# Patient Record
Sex: FEMALE | Race: WHITE
[De-identification: names, ages, dates, MRNs, and addresses within clinical notes are randomized per-mention and may not be internally consistent; named-entity substitution may affect disease eponyms.]

---

## 2019-11-06 ENCOUNTER — HOSPITAL ENCOUNTER (OUTPATIENT)
Dept: HOSPITAL 74 - FASU | Age: 79
Discharge: HOME | End: 2019-11-06
Attending: ORTHOPAEDIC SURGERY
Payer: COMMERCIAL

## 2019-11-06 VITALS — HEART RATE: 70 BPM | DIASTOLIC BLOOD PRESSURE: 66 MMHG | SYSTOLIC BLOOD PRESSURE: 124 MMHG

## 2019-11-06 VITALS — TEMPERATURE: 97.9 F

## 2019-11-06 VITALS — BODY MASS INDEX: 19.5 KG/M2

## 2019-11-06 DIAGNOSIS — M18.11: Primary | ICD-10-CM

## 2019-11-06 PROCEDURE — 0RQS0ZZ REPAIR RIGHT CARPOMETACARPAL JOINT, OPEN APPROACH: ICD-10-PCS | Performed by: ORTHOPAEDIC SURGERY

## 2019-11-06 PROCEDURE — 0LX70ZZ TRANSFER RIGHT HAND TENDON, OPEN APPROACH: ICD-10-PCS | Performed by: ORTHOPAEDIC SURGERY

## 2019-11-11 NOTE — OP
DATE OF OPERATION:  11/06/2019

 

PREOPERATIVE DIAGNOSIS:  Right basal joint arthritis.

 

POSTOPERATIVE DIAGNOSIS:  Right basal joint arthritis.

 

OPERATIVE PROCEDURE:  

1.  Right basal joint arthroplasty.

2.  Right thumb carpometacarpal tendon transfer.

 

SURGEON:  Kee Proctor MD

 

ASSISTANT:  MARLEN Mathew

 

ANESTHESIA:  Regional.

 

COMPLICATIONS:  None.

 

ESTIMATED BLOOD LOSS:  Minimal.

 

INDICATIONS FOR PROCEDURE:  The patient is a 79-year-old female with the above

finding, indicated for operative treatment.  The risks, benefits and alternatives

were discussed with the patient at length.  Proper informed consent was obtained.  

 

PROCEDURE:  After proper identification of patient and correct operative site,

patient was brought to the operating room, placed supine on the table, prominences

well padded.  Sedation and regional anesthesia were given.  Intravenous antibiotics

given.  Timeout procedure was performed.  Right upper extremity was prepped and

draped in usual sterile fashion.  Well-padded tourniquet was placed over the sterile

prep.  Esmarch bandage was used to exsanguinate right upper extremity.  Tourniquet

was inflated to 250 mmHg.  Curvilinear incision made over the thumb base in a Mclaughlin

approach.  Incision was taken sharply through skin.  Blunt dissection was performed

through the subcutaneous tissues.  Thenar muscles were elevated off of the

carpometacarpal joint of the thumb.  Longitudinal capsulotomy was performed and the

carpometacarpal joint was opened.  Severe arthrosis was noted with loose bodies as

well as significant synovitis.  The trapezium was removed with subperiosteal

elevation as a whole piece.  Arthroplasty space was adequate.  A suspension plasty

was then performed by placing an Arthrex SwiveLock anchor in the base of the 2nd

metacarpal and using a labral taped suture suspending the metacarpal in the proper

tension and securing with a second Arthrex SwiveLock anchor.  This provided excellent

motion and good stability for the arthroplasty.  The capsule was repaired for

hematoma formation and the extensor pollicis brevis was transferred to the dorsal

aspect of the capsule for further augmentation.  The wound was irrigated and repaired

in layers using 4-0 Vicryl and 4-0 Monocryl suture.  Steri-Strips and sterile

dressings were applied.  Splint was placed.  Patient was reversed from anesthesia,

brought to recovery room in stable condition.  

 

Carlos Manzano, the assistant, was integral throughout this procedure.  Procedure

could not have been performed without a skilled operative assistant.

 

 

KEE PROCTOR M.D.

 

TALIB/[id number]

DD: 11/11/2019 08:08

DT: 11/11/2019 14:56

Job #:  [id number]